# Patient Record
Sex: MALE | Race: WHITE | NOT HISPANIC OR LATINO | Employment: OTHER | URBAN - METROPOLITAN AREA
[De-identification: names, ages, dates, MRNs, and addresses within clinical notes are randomized per-mention and may not be internally consistent; named-entity substitution may affect disease eponyms.]

---

## 2021-12-14 PROBLEM — R07.9 CHEST PAIN: Status: ACTIVE | Noted: 2021-12-14

## 2021-12-14 PROBLEM — R00.1 BRADYCARDIA: Status: ACTIVE | Noted: 2021-12-14

## 2021-12-14 PROBLEM — I15.9 SECONDARY HYPERTENSION: Status: ACTIVE | Noted: 2020-06-30

## 2021-12-14 PROBLEM — M62.81 MUSCLE WEAKNESS: Status: ACTIVE | Noted: 2019-05-30

## 2021-12-14 PROBLEM — I42.9 CARDIOMYOPATHY: Status: ACTIVE | Noted: 2021-12-14

## 2022-03-03 PROBLEM — M25.522 LEFT ELBOW PAIN: Status: ACTIVE | Noted: 2022-03-03

## 2022-03-03 PROBLEM — G56.22 ENTRAPMENT OF LEFT ULNAR NERVE: Status: ACTIVE | Noted: 2022-03-03

## 2022-05-30 PROBLEM — T68.XXXA HYPOTHERMIA: Status: ACTIVE | Noted: 2022-05-30

## 2022-07-08 PROBLEM — Z51.81 ENCOUNTER FOR MEDICATION MONITORING: Status: ACTIVE | Noted: 2022-07-08

## 2022-07-08 PROBLEM — F32.A DEPRESSION: Status: ACTIVE | Noted: 2022-07-08

## 2022-07-11 PROBLEM — N50.89 SCROTAL SWELLING: Status: ACTIVE | Noted: 2022-07-11

## 2022-11-09 PROBLEM — Z01.818 PRE-OP EVALUATION: Status: ACTIVE | Noted: 2022-11-09

## 2022-11-09 PROBLEM — G70.9 RESTRICTIVE LUNG MECHANICS DUE TO NEUROMUSCULAR DISEASE: Status: ACTIVE | Noted: 2022-11-09

## 2022-11-09 PROBLEM — J98.4 RESTRICTIVE LUNG MECHANICS DUE TO NEUROMUSCULAR DISEASE: Status: ACTIVE | Noted: 2022-11-09

## 2023-01-05 PROBLEM — G89.29 CHRONIC MIDLINE LOW BACK PAIN WITHOUT SCIATICA: Status: ACTIVE | Noted: 2023-01-05

## 2023-01-05 PROBLEM — R29.898 TMJ CLICK: Status: ACTIVE | Noted: 2023-01-05

## 2023-01-05 PROBLEM — B02.29 POST HERPETIC NEURALGIA: Status: ACTIVE | Noted: 2023-01-05

## 2023-01-05 PROBLEM — M54.50 CHRONIC MIDLINE LOW BACK PAIN WITHOUT SCIATICA: Status: ACTIVE | Noted: 2023-01-05

## 2023-02-10 PROBLEM — M26.659 ARTHROPATHY OF TEMPOROMANDIBULAR JOINT: Status: ACTIVE | Noted: 2023-02-10

## 2023-07-04 PROBLEM — E29.1 HYPOGONADISM IN MALE: Status: ACTIVE | Noted: 2023-07-04

## 2023-08-08 PROBLEM — S50.02XA CONTUSION OF LEFT ELBOW: Status: ACTIVE | Noted: 2023-08-08

## 2023-08-16 ENCOUNTER — PATIENT MESSAGE (OUTPATIENT)
Dept: ADMINISTRATIVE | Facility: HOSPITAL | Age: 68
End: 2023-08-16

## 2023-08-28 ENCOUNTER — PATIENT OUTREACH (OUTPATIENT)
Dept: ADMINISTRATIVE | Facility: HOSPITAL | Age: 68
End: 2023-08-28

## 2023-10-02 PROBLEM — R35.1 NOCTURIA ASSOCIATED WITH BENIGN PROSTATIC HYPERPLASIA: Status: ACTIVE | Noted: 2023-10-02

## 2023-10-02 PROBLEM — R33.9 URINARY RETENTION: Status: ACTIVE | Noted: 2023-10-02

## 2023-10-02 PROBLEM — N40.1 NOCTURIA ASSOCIATED WITH BENIGN PROSTATIC HYPERPLASIA: Status: ACTIVE | Noted: 2023-10-02

## 2023-11-20 ENCOUNTER — SOCIAL WORK (OUTPATIENT)
Dept: ADMINISTRATIVE | Facility: OTHER | Age: 68
End: 2023-11-20

## 2023-11-20 NOTE — PROGRESS NOTES
Was asked by Palliative Care Nurse Practitioner to assist pt with home health services. Pt informed NP that he is already setup with home health services with [facility]. Informed NP Sw will follow up with pt verify his home health agency. Contacted [phone] in regards to NP wanting Sw to assist with setting him up with home health for lab draw and medication management. Pt reported that he has a Nurse with [facility] who comes to see him once a week to check his vital signs and provide him with his injection. Informed pt that Sw will send order to [facility] for assistance with new orders. Pt verbalized understanding. Faxed new orders to [facility]@[phone] to assist pt with medication management and lab draws. Will continue to assist.       Analy Maria LMSW    Ext [phone]/Pager [phone]

## 2023-11-21 ENCOUNTER — SOCIAL WORK (OUTPATIENT)
Dept: ADMINISTRATIVE | Facility: OTHER | Age: 68
End: 2023-11-21

## 2023-11-21 NOTE — PROGRESS NOTES
Contacted [facility]@[phone], spoke with Myra to verify that they received new orders for lab draw and medication management and she confirmed receipt of orders and reported that the Nurse is scheduled to get the lab drawn on tomorrow Wednesday 11/22/23. Andrea also inquired about the order for medication management and she reported that this is something that they should already be doing but states she will mention it to the Nurse again. Will continue to assist.       Analy Maria [facility]    Ext [phone]/Pager [phone]

## 2023-12-04 PROBLEM — Z12.11 SCREENING FOR COLON CANCER: Status: ACTIVE | Noted: 2022-07-08

## 2024-01-10 ENCOUNTER — SOCIAL WORK (OUTPATIENT)
Dept: ADMINISTRATIVE | Facility: OTHER | Age: 69
End: 2024-01-10

## 2024-01-10 NOTE — PROGRESS NOTES
Consult received to assist pt with obtaining an electrical cb lift and sling. Contacted [facility]@[phone], spoke with Miriam to inquire if they carry electrical cb lifts and sling and she confirmed that they do. NP was notified. Faxed referral to [facility]@[phone] for assistance with DME. Contacted [phone] and was notified of referral and that someone from [facility] will contact regarding equipment. Pt verbalized understanding. Will continue to follow pt and assist.       Analy Maria [facility]    Ext [phone]/Pager [phone]

## 2024-01-18 ENCOUNTER — SOCIAL WORK (OUTPATIENT)
Dept: ADMINISTRATIVE | Facility: OTHER | Age: 69
End: 2024-01-18

## 2024-01-18 NOTE — PROGRESS NOTES
Contacted [facility]@[phone], spoke with Melania to follow up on the status of the electrical cb lift and sling and was told they are behind on orders due to their office being closed for several days. Melania states she is going to try and get going on order and will call and let Sw know if she need anything. Called [phone] and was notified. Pt verbalized understanding. No further needs were noted at that time. Will continue to assist as needed.       Analy Maria LMSW    Ext [phone]/Pager [phone]

## 2024-01-25 ENCOUNTER — SOCIAL WORK (OUTPATIENT)
Dept: ADMINISTRATIVE | Facility: OTHER | Age: 69
End: 2024-01-25

## 2024-01-25 NOTE — PROGRESS NOTES
Contacted [facility]@[phone], spoke with Manjit to get a status update on DME(electrical cb lift and sling) and was told that the electrical cb lifts are for bariatric pt's. Informed Manjit that Angela was not told that initial by Miriam prior to referral being made. Manjit states she can call and let pt know and can call Sw back. Received return call from Manjit and she states she spoke with pt and pt wife was in the background and they told her that they already have a manual cb lift. Manjit told pt that if they would like a electrical cb lift he will have to pay cash price for it every month until paid off because insurance is not going to pay for another lift. Pt informed her to cancel order because he is not going to pay for a lift when he has insurance. No further needs were noted at that time. Will continue to assist as needed.       Analy Maria ANGELA    Ext [phone]/Pager [phone]

## 2024-02-27 DIAGNOSIS — Z00.00 ENCOUNTER FOR MEDICARE ANNUAL WELLNESS EXAM: ICD-10-CM

## 2024-04-19 ENCOUNTER — SOCIAL WORK (OUTPATIENT)
Dept: ADMINISTRATIVE | Facility: OTHER | Age: 69
End: 2024-04-19

## 2024-04-19 NOTE — PROGRESS NOTES
Andrea received a consult to assist with home health services. Andrea emailed Dr. Oquendo requesting she notify Andrea once her progress note is complete. Andrea explained the note is needed to complete a home health referral.    Carlee Stoner LCSW    [phone]

## 2024-04-22 ENCOUNTER — TELEPHONE (OUTPATIENT)
Dept: ADMINISTRATIVE | Facility: OTHER | Age: 69
End: 2024-04-22

## 2024-04-22 NOTE — TELEPHONE ENCOUNTER
Andrea received a consult to assist with home health services. Andrea called and spoke to Myra at [facility] ([phone]). Myra confirmed they are providing skilled nursing and aides for Patient. The nurse gives him the testosterone injections. The aide assists with bathing Patient. Andrea faxed the PT order to Myra to add to his current services.    Carlee Stoner LCSW    [phone]

## 2024-09-03 ENCOUNTER — PATIENT OUTREACH (OUTPATIENT)
Dept: ADMINISTRATIVE | Facility: HOSPITAL | Age: 69
End: 2024-09-03

## 2024-09-03 DIAGNOSIS — I15.9 SECONDARY HYPERTENSION: Primary | ICD-10-CM

## 2024-11-05 ENCOUNTER — OUTPATIENT CASE MANAGEMENT (OUTPATIENT)
Dept: ADMINISTRATIVE | Facility: OTHER | Age: 69
End: 2024-11-05

## 2024-11-05 NOTE — PROGRESS NOTES
Sw received a consult to assist with caregiver services. Andrea called and spoke to Myra at [facility] ([phone]). Myra reports they currently do not have a home health aide on staff. This is why Patient is not receiving services. Patient is scheduled for PT evaluation on tomorrow. Andrea called and spoke to Patient ([phone]). Sw explained he is not receiving the home health aide assistance because [facility] does not have any staff at this time. Patient mentioned he does have someone come in to help a few times a month through [facility]. His wife needs more assistance than that, however. Sw discussed with him the LTC-S offered through the [facility]. They were provided the contact information to call and start the process. Patient will give them a call. He denied any other concerns at this time.    Carlee Stoner LCSW    [phone]

## 2025-01-15 PROBLEM — J96.11 CHRONIC RESPIRATORY FAILURE WITH HYPOXIA: Status: ACTIVE | Noted: 2025-01-15

## 2025-01-15 PROBLEM — Z99.11 DEPENDENCE ON NON-INVASIVE VENTILATION: Status: ACTIVE | Noted: 2025-01-15

## 2025-02-03 ENCOUNTER — OUTPATIENT CASE MANAGEMENT (OUTPATIENT)
Dept: ADMINISTRATIVE | Facility: OTHER | Age: 70
End: 2025-02-03

## 2025-02-03 NOTE — PROGRESS NOTES
Received office visit from KATIA Velázquez regarding assisting pt with hospice services with [facility]; Sw called pt at [phone] regarding discussion with KATIA; pt verbalized understanding and pt's spouse (healthcare power of ) was also on the phone; Sw discussed hospice services and pt/ pt's spouse verbalized understanding and was told that a hospice referral would be submitted to [facility]; Sw asked pt if he was receiving home health services and pt stated that he's receiving home health services through [facility]; Sw explained to pt that he cannot receive both services at the same time; pt verbalized understanding and verbalized to proceed with hospice; Sw informed pt that [facility] would be contacted regarding him opting for hospice services; Sw at that time faxed a referral to [facility] at [phone] for assistance; Sw called [facility] at [phone], spoke with Myra regarding pt opt for hospice and was told to contact agency when pt has been admitted to hospice, then agency will d/c pt; Sw called pt at [phone] and informed him of discussion with [facility] representative; pt verbalized understanding; Sw will continue to follow pt to assist with needs and concerns.    ASHLEY Lopez    Ext. [phone]  Pager #[phone]

## 2025-02-04 ENCOUNTER — OUTPATIENT CASE MANAGEMENT (OUTPATIENT)
Dept: ADMINISTRATIVE | Facility: OTHER | Age: 70
End: 2025-02-04

## 2025-02-04 NOTE — PROGRESS NOTES
Called [facility] at [phone], spoke with Jamila regarding receiving referral and was told that referral was received; Sw will continue to follow pt to assist with needs and concerns.    ASHLEY Lopez    Ext. [phone]  Pager #[phone]

## 2025-02-06 ENCOUNTER — OUTPATIENT CASE MANAGEMENT (OUTPATIENT)
Dept: ADMINISTRATIVE | Facility: OTHER | Age: 70
End: 2025-02-06

## 2025-02-06 NOTE — PROGRESS NOTES
Called [facility] at [phone], spoke with Jamila regarding pt being admitted for services and was told that pt was admitted on 02/4/25.    ASHLEY Lopez    Ext. [phone]  Pager #[phone]